# Patient Record
Sex: FEMALE | URBAN - METROPOLITAN AREA
[De-identification: names, ages, dates, MRNs, and addresses within clinical notes are randomized per-mention and may not be internally consistent; named-entity substitution may affect disease eponyms.]

---

## 2022-12-13 ENCOUNTER — OUT OF OFFICE VISIT (OUTPATIENT)
Dept: URBAN - METROPOLITAN AREA MEDICAL CENTER 25 | Facility: MEDICAL CENTER | Age: 52
End: 2022-12-13

## 2022-12-13 ENCOUNTER — CLAIMS CREATED FROM THE CLAIM WINDOW (OUTPATIENT)
Dept: URBAN - METROPOLITAN AREA MEDICAL CENTER 25 | Facility: MEDICAL CENTER | Age: 52
End: 2022-12-13
Payer: SELF-PAY

## 2022-12-13 DIAGNOSIS — K62.5 ANAL BLEEDING: ICD-10-CM

## 2022-12-13 PROCEDURE — 992 APS NON BILLABLE: Performed by: PHYSICIAN ASSISTANT

## 2022-12-14 ENCOUNTER — OUT OF OFFICE VISIT (OUTPATIENT)
Dept: URBAN - METROPOLITAN AREA MEDICAL CENTER 25 | Facility: MEDICAL CENTER | Age: 52
End: 2022-12-14
Payer: SELF-PAY

## 2022-12-14 DIAGNOSIS — D61.818 OTHER PANCYTOPENIA: ICD-10-CM

## 2022-12-14 DIAGNOSIS — R19.7 ACUTE DIARRHEA: ICD-10-CM

## 2022-12-14 DIAGNOSIS — K62.5 ANAL BLEEDING: ICD-10-CM

## 2022-12-14 PROCEDURE — 99232 SBSQ HOSP IP/OBS MODERATE 35: CPT | Performed by: INTERNAL MEDICINE

## 2022-12-14 PROCEDURE — 99254 IP/OBS CNSLTJ NEW/EST MOD 60: CPT | Performed by: INTERNAL MEDICINE

## 2022-12-19 ENCOUNTER — OUT OF OFFICE VISIT (OUTPATIENT)
Dept: URBAN - METROPOLITAN AREA MEDICAL CENTER 25 | Facility: MEDICAL CENTER | Age: 52
End: 2022-12-19
Payer: SELF-PAY

## 2022-12-19 DIAGNOSIS — K62.5 ANAL BLEEDING: ICD-10-CM

## 2022-12-19 DIAGNOSIS — R19.7 ACUTE DIARRHEA: ICD-10-CM

## 2022-12-19 PROCEDURE — 99232 SBSQ HOSP IP/OBS MODERATE 35: CPT | Performed by: INTERNAL MEDICINE

## 2023-01-17 ENCOUNTER — DASHBOARD ENCOUNTERS (OUTPATIENT)
Age: 53
End: 2023-01-17

## 2023-01-18 ENCOUNTER — OFFICE VISIT (OUTPATIENT)
Dept: URBAN - METROPOLITAN AREA CLINIC 19 | Facility: CLINIC | Age: 53
End: 2023-01-18

## 2023-01-18 PROBLEM — 11010461000119101: Status: ACTIVE | Noted: 2023-01-18

## 2023-01-18 PROBLEM — 87763006: Status: ACTIVE | Noted: 2023-01-18

## 2023-01-18 PROBLEM — 64226004: Status: ACTIVE | Noted: 2023-01-18

## 2023-01-18 RX ORDER — MORPHINE SULFATE 30 MG/1
1 TABLET TABLET ORAL
Status: ACTIVE | COMMUNITY

## 2023-01-18 RX ORDER — LEVETIRACETAM 500 MG/1
1 TABLET TABLET, FILM COATED ORAL
Status: ACTIVE | COMMUNITY

## 2023-01-18 RX ORDER — LISINOPRIL 10 MG/1
1 TABLET TABLET ORAL ONCE A DAY
Status: ACTIVE | COMMUNITY

## 2023-01-18 RX ORDER — DULOXETINE HYDROCHLORIDE 30 MG/1
1 CAPSULE CAPSULE, DELAYED RELEASE ORAL ONCE A DAY
Status: ACTIVE | COMMUNITY

## 2023-01-18 RX ORDER — GABAPENTIN 300 MG/1
1 CAPSULE CAPSULE ORAL ONCE A DAY
Status: ACTIVE | COMMUNITY

## 2023-01-18 NOTE — HPI-TODAY'S VISIT:
1/18/2023:  Pratik:  53 yo F with small cell carcinoma of the lung, and MS, presents as a hospital f/u from [facility] for neutropenic colitis and LGIB.  Seen on initial consultation by Dr. Rubina Day in mid-December 2022.  CTA did not show active source of GIB.  When neutropenia improved, pt was offered a colonoscopy and the prep was started.  However, the pt could not tolerate her prep and declined the colonoscopy.  Outpatient follow up was recommended.  She presents today for clinic follow up.